# Patient Record
Sex: FEMALE | Race: WHITE
[De-identification: names, ages, dates, MRNs, and addresses within clinical notes are randomized per-mention and may not be internally consistent; named-entity substitution may affect disease eponyms.]

---

## 2018-03-05 ENCOUNTER — HOSPITAL ENCOUNTER (OUTPATIENT)
Dept: HOSPITAL 53 - M RAD | Age: 68
End: 2018-03-05
Attending: INTERNAL MEDICINE
Payer: MEDICARE

## 2018-03-05 ENCOUNTER — HOSPITAL ENCOUNTER (OUTPATIENT)
Dept: HOSPITAL 53 - M LAB REF | Age: 68
End: 2018-03-05
Attending: INTERNAL MEDICINE
Payer: MEDICARE

## 2018-03-05 DIAGNOSIS — Z13.9: ICD-10-CM

## 2018-03-05 DIAGNOSIS — R92.8: Primary | ICD-10-CM

## 2018-03-05 DIAGNOSIS — N60.01: ICD-10-CM

## 2018-03-05 DIAGNOSIS — Z13.9: Primary | ICD-10-CM

## 2018-03-05 DIAGNOSIS — N63.11: ICD-10-CM

## 2018-03-05 DIAGNOSIS — N60.02: ICD-10-CM

## 2018-03-05 DIAGNOSIS — R92.1: ICD-10-CM

## 2018-03-05 LAB
25(OH)D3 SERPL-MCNC: 27.7 NG/ML (ref 30–100)
ALBUMIN/GLOBULIN RATIO: 1.33 (ref 1–1.93)
ALBUMIN: 4 GM/DL (ref 3.2–5.2)
ALKALINE PHOSPHATASE: 107 U/L (ref 45–117)
ALT SERPL W P-5'-P-CCNC: 21 U/L (ref 12–78)
ANION GAP: 7 MEQ/L (ref 8–16)
AST SERPL-CCNC: 15 U/L (ref 7–37)
BASO #: 0.1 10^3/UL (ref 0–0.2)
BASO %: 1.4 % (ref 0–1)
BILIRUBIN,TOTAL: 0.5 MG/DL (ref 0.2–1)
BLOOD UREA NITROGEN: 13 MG/DL (ref 7–18)
CALCIUM LEVEL: 9.5 MG/DL (ref 8.8–10.2)
CARBON DIOXIDE LEVEL: 29 MEQ/L (ref 21–32)
CHLORIDE LEVEL: 107 MEQ/L (ref 98–107)
CHOLEST SERPL-MCNC: 213 MG/DL (ref ?–200)
CHOLESTEROL RISK RATIO: 2.96 (ref ?–5)
CREATININE FOR GFR: 0.69 MG/DL (ref 0.55–1.3)
EOS #: 0.2 10^3/UL (ref 0–0.5)
EOSINOPHIL NFR BLD AUTO: 3.2 % (ref 0–3)
EST. AVERAGE GLUCOSE BLD GHB EST-MCNC: 97 MG/DL (ref 60–110)
GFR SERPL CREATININE-BSD FRML MDRD: > 60 ML/MIN/{1.73_M2} (ref 45–?)
GLUCOSE, FASTING: 92 MG/DL (ref 70–100)
HCV AB SER QL: 0 INDEX (ref ?–0.8)
HDLC SERPL-MCNC: 72 MG/DL (ref 40–?)
HEMATOCRIT: 40.4 % (ref 36–47)
HEMOGLOBIN: 13.3 G/DL (ref 12–16)
HIV 1&2 SCREEN CENTAUR: NEGATIVE
IMMATURE GRANULOCYTE %: 0 % (ref 0–3)
LDL CHOLESTEROL: 105.4 MG/DL (ref ?–100)
LYMPH #: 1.4 10^3/UL (ref 1.5–4.5)
LYMPH %: 27.9 % (ref 24–44)
MEAN CORPUSCULAR HEMOGLOBIN: 30.5 PG (ref 27–33)
MEAN CORPUSCULAR HGB CONC: 32.9 G/DL (ref 32–36.5)
MEAN CORPUSCULAR VOLUME: 92.7 FL (ref 80–96)
MONO #: 0.6 10^3/UL (ref 0–0.8)
MONO %: 11.6 % (ref 0–5)
NEUTROPHILS #: 2.8 10^3/UL (ref 1.8–7.7)
NEUTROPHILS %: 55.9 % (ref 36–66)
NONHDLC SERPL-MCNC: 141 MG/DL
NRBC BLD AUTO-RTO: 0 % (ref 0–0)
PLATELET COUNT, AUTOMATED: 331 10^3/UL (ref 150–450)
POTASSIUM SERUM: 4.3 MEQ/L (ref 3.5–5.1)
RED BLOOD COUNT: 4.36 10^6/UL (ref 4–5.4)
RED CELL DISTRIBUTION WIDTH: 12 % (ref 11.5–14.5)
SODIUM LEVEL: 143 MEQ/L (ref 136–145)
THYROID STIMULATING HORMONE: 2.01 UIU/ML (ref 0.36–3.74)
TOTAL PROTEIN: 7 GM/DL (ref 6.4–8.2)
TRIGLYCERIDES LEVEL: 178 MG/DL (ref ?–150)
WHITE BLOOD COUNT: 5 10^3/UL (ref 4–10)

## 2018-03-05 PROCEDURE — 77066 DX MAMMO INCL CAD BI: CPT

## 2018-04-19 ENCOUNTER — HOSPITAL ENCOUNTER (OUTPATIENT)
Dept: HOSPITAL 53 - M WUC | Age: 68
End: 2018-04-19
Attending: INTERNAL MEDICINE
Payer: MEDICARE

## 2018-04-19 DIAGNOSIS — C50.919: Primary | ICD-10-CM

## 2018-04-19 LAB
ADD MANUAL DIFFER: YES
BASOPHILS: 2 % (ref 0–4)
DIFF SLIDE NUMBER: 174
EOSINOPHILS: 3 % (ref 0–5)
HEMATOCRIT: 35.4 % (ref 36–47)
HEMOGLOBIN: 12.4 G/DL (ref 12–15.5)
LYMPHOCYTES: 69 % (ref 16–52)
MEAN CORPUSCULAR HEMOGLOBIN: 31 PG (ref 27–33)
MEAN CORPUSCULAR HGB CONC: 35 G/DL (ref 32–36.5)
MEAN CORPUSCULAR VOLUME: 88.5 FL (ref 80–96)
MONOCYTES: 3 % (ref 0–8)
NEUTROPHILS: 23 % (ref 35–75)
NRBC BLD AUTO-RTO: 0 % (ref 0–0)
PLATELET BLD QL SMEAR: NORMAL
PLATELET COUNT, AUTOMATED: 200 10^3/UL (ref 150–450)
POSITIVE MORPH: (no result)
RBC MORPHOLOGY: NORMAL
RED BLOOD COUNT: 4 10^6/UL (ref 4–5.4)
RED CELL DISTRIBUTION WIDTH: 11.5 % (ref 11.5–14.5)
WHITE BLOOD COUNT: 3.8 10^3/UL (ref 4–10)

## 2018-04-19 PROCEDURE — 85025 COMPLETE CBC W/AUTO DIFF WBC: CPT

## 2018-04-27 ENCOUNTER — HOSPITAL ENCOUNTER (OUTPATIENT)
Dept: HOSPITAL 53 - M LAB REF | Age: 68
End: 2018-04-27
Attending: INTERNAL MEDICINE
Payer: MEDICARE

## 2018-04-27 DIAGNOSIS — I95.1: Primary | ICD-10-CM

## 2018-04-27 LAB
ANION GAP: 7 MEQ/L (ref 8–16)
BLOOD UREA NITROGEN: 29 MG/DL (ref 7–18)
CALCIUM LEVEL: 9.5 MG/DL (ref 8.8–10.2)
CARBON DIOXIDE LEVEL: 26 MEQ/L (ref 21–32)
CHLORIDE LEVEL: 108 MEQ/L (ref 98–107)
CREATININE FOR GFR: 0.62 MG/DL (ref 0.55–1.3)
GFR SERPL CREATININE-BSD FRML MDRD: > 60 ML/MIN/{1.73_M2} (ref 45–?)
GLUCOSE, FASTING: 109 MG/DL (ref 70–100)
MAGNESIUM LEVEL: 2.1 MG/DL (ref 1.8–2.4)
POTASSIUM SERUM: 3.4 MEQ/L (ref 3.5–5.1)
SODIUM LEVEL: 141 MEQ/L (ref 136–145)

## 2018-04-27 PROCEDURE — 83735 ASSAY OF MAGNESIUM: CPT

## 2018-05-03 ENCOUNTER — HOSPITAL ENCOUNTER (OUTPATIENT)
Dept: HOSPITAL 53 - M WUC | Age: 68
End: 2018-05-03
Attending: INTERNAL MEDICINE
Payer: MEDICARE

## 2018-05-03 DIAGNOSIS — C50.411: Primary | ICD-10-CM

## 2018-05-03 DIAGNOSIS — Z17.1: ICD-10-CM

## 2018-05-03 LAB
ADD MANUAL DIFFER: YES
ATYPICAL LYMPH: 4 % (ref 0–5)
BANDS: 3 % (ref ?–11)
BASOPHILS: 2 % (ref 0–4)
DIFF SLIDE NUMBER: 171
EOSINOPHILS: 4 % (ref 0–5)
HEMATOCRIT: 30.7 % (ref 36–47)
HEMOGLOBIN: 10.2 G/DL (ref 12–15.5)
LYMPHOCYTES: 28 % (ref 16–52)
MEAN CORPUSCULAR HEMOGLOBIN: 31.1 PG (ref 27–33)
MEAN CORPUSCULAR HGB CONC: 33.2 G/DL (ref 32–36.5)
MEAN CORPUSCULAR VOLUME: 93.6 FL (ref 80–96)
MONOCYTES: 5 % (ref 0–8)
NEUTROPHILS: 54 % (ref 35–75)
NRBC BLD AUTO-RTO: 0 % (ref 0–0)
PLATELET BLD QL SMEAR: NORMAL
PLATELET COUNT, AUTOMATED: 292 10^3/UL (ref 150–450)
POSITIVE MORPH: (no result)
RBC MORPHOLOGY: NORMAL
RED BLOOD COUNT: 3.28 10^6/UL (ref 4–5.4)
RED CELL DISTRIBUTION WIDTH: 12.8 % (ref 11.5–14.5)
WHITE BLOOD COUNT: 3.7 10^3/UL (ref 4–10)

## 2018-05-03 PROCEDURE — 85025 COMPLETE CBC W/AUTO DIFF WBC: CPT

## 2018-05-15 ENCOUNTER — HOSPITAL ENCOUNTER (OUTPATIENT)
Dept: HOSPITAL 53 - M LAB REF | Age: 68
End: 2018-05-15
Attending: INTERNAL MEDICINE
Payer: MEDICARE

## 2018-05-15 DIAGNOSIS — I95.1: Primary | ICD-10-CM

## 2018-05-15 LAB
ADD MANUAL DIFFER: YES
ALBUMIN/GLOBULIN RATIO: 1.35 (ref 1–1.93)
ALBUMIN: 3.5 GM/DL (ref 3.2–5.2)
ALKALINE PHOSPHATASE: 193 U/L (ref 45–117)
ALT SERPL W P-5'-P-CCNC: 20 U/L (ref 12–78)
ANION GAP: 7 MEQ/L (ref 8–16)
ANISOCYTOSIS: (no result)
APPEARANCE, URINE: (no result)
AST SERPL-CCNC: 15 U/L (ref 7–37)
BACTERIA UR QL AUTO: NEGATIVE
BILIRUBIN, URINE AUTO: NEGATIVE
BILIRUBIN,TOTAL: 0.3 MG/DL (ref 0.2–1)
BLOOD UREA NITROGEN: 19 MG/DL (ref 7–18)
BLOOD, URINE BLOOD: NEGATIVE
CALCIUM LEVEL: 9.3 MG/DL (ref 8.8–10.2)
CARBON DIOXIDE LEVEL: 28 MEQ/L (ref 21–32)
CHLORIDE LEVEL: 108 MEQ/L (ref 98–107)
CORTISOL AM: 17.3 UG/DL (ref 4.3–22.4)
CREATININE FOR GFR: 0.56 MG/DL (ref 0.55–1.3)
DIFF SLIDE NUMBER: 283
GFR SERPL CREATININE-BSD FRML MDRD: > 60 ML/MIN/{1.73_M2} (ref 45–?)
GLUCOSE, FASTING: 100 MG/DL (ref 70–100)
GLUCOSE, URINE (UA) AUTO: NEGATIVE MG/DL
HEMATOCRIT: 30.8 % (ref 36–47)
HEMOGLOBIN: 10.3 G/DL (ref 12–15.5)
HYPERSEGMENTED POLYS: (no result)
KETONE, URINE AUTO: NEGATIVE MG/DL
LEUKOCYTE ESTERASE UR QL STRIP.AUTO: NEGATIVE
LYMPHOCYTES: 3 % (ref 16–52)
MAGNESIUM LEVEL: 2.1 MG/DL (ref 1.8–2.4)
MEAN CORPUSCULAR HEMOGLOBIN: 31.6 PG (ref 27–33)
MEAN CORPUSCULAR HGB CONC: 33.4 G/DL (ref 32–36.5)
MEAN CORPUSCULAR VOLUME: 94.5 FL (ref 80–96)
MONOCYTES: 2 % (ref 0–8)
MUCUS, URINE: (no result)
NEUTROPHILS: 95 % (ref 35–75)
NITRITE, URINE AUTO: NEGATIVE
NRBC BLD AUTO-RTO: 0 % (ref 0–0)
PH,URINE: 5 UNITS (ref 5–9)
PLATELET BLD QL SMEAR: NORMAL
PLATELET COUNT, AUTOMATED: 187 10^3/UL (ref 150–450)
POIKILOCYTOSIS: (no result)
POS COUNT: (no result)
POSITIVE MORPH: (no result)
POTASSIUM SERUM: 4 MEQ/L (ref 3.5–5.1)
PROT UR QL STRIP.AUTO: NEGATIVE MG/DL
RBC, URINE AUTO: 2 /HPF (ref 0–3)
RED BLOOD COUNT: 3.26 10^6/UL (ref 4–5.4)
RED CELL DISTRIBUTION WIDTH: 14.1 % (ref 11.5–14.5)
SODIUM LEVEL: 143 MEQ/L (ref 136–145)
SPECIFIC GRAVITY URINE AUTO: 1.02 (ref 1–1.03)
SQUAMOUS #/AREA URNS AUTO: 0 /HPF (ref 0–6)
TOTAL PROTEIN: 6.1 GM/DL (ref 6.4–8.2)
URIC ACID CRYSTALS: (no result)
UROBILINOGEN, URINE AUTO: 0.2 MG/DL (ref 0–2)
WBC, URINE AUTO: 1 /HPF (ref 0–3)
WHITE BLOOD COUNT: 24.8 10^3/UL (ref 4–10)

## 2018-05-15 PROCEDURE — 83735 ASSAY OF MAGNESIUM: CPT

## 2018-07-18 ENCOUNTER — HOSPITAL ENCOUNTER (OUTPATIENT)
Dept: HOSPITAL 53 - M CARPUL | Age: 68
End: 2018-07-18
Attending: INTERNAL MEDICINE
Payer: MEDICARE

## 2018-07-18 DIAGNOSIS — I34.0: ICD-10-CM

## 2018-07-18 DIAGNOSIS — I27.20: ICD-10-CM

## 2018-07-18 DIAGNOSIS — R06.09: Primary | ICD-10-CM

## 2018-07-18 PROCEDURE — 93306 TTE W/DOPPLER COMPLETE: CPT

## 2018-07-20 ENCOUNTER — HOSPITAL ENCOUNTER (OUTPATIENT)
Dept: HOSPITAL 53 - M RAD | Age: 68
End: 2018-07-20
Attending: INTERNAL MEDICINE
Payer: MEDICARE

## 2018-07-20 DIAGNOSIS — R06.02: ICD-10-CM

## 2018-07-20 DIAGNOSIS — E04.1: Primary | ICD-10-CM

## 2018-07-20 DIAGNOSIS — C50.919: ICD-10-CM

## 2018-11-01 ENCOUNTER — HOSPITAL ENCOUNTER (OUTPATIENT)
Dept: HOSPITAL 53 - M ONCR | Age: 68
End: 2018-11-01
Attending: RADIOLOGY
Payer: MEDICARE

## 2018-11-01 DIAGNOSIS — C50.911: Primary | ICD-10-CM

## 2018-11-07 ENCOUNTER — HOSPITAL ENCOUNTER (OUTPATIENT)
Dept: HOSPITAL 53 - M ONCR | Age: 68
LOS: 23 days | End: 2018-11-30
Attending: RADIOLOGY
Payer: MEDICARE

## 2018-11-07 DIAGNOSIS — C50.411: Primary | ICD-10-CM

## 2018-11-07 PROCEDURE — 77300 RADIATION THERAPY DOSE PLAN: CPT

## 2018-12-05 NOTE — RADONC
RADIATION ONCOLOGY PROGRESS NOTE

 

DATE:  12/03/2018

 

CHART NUMBER:  

 

Ms. Wilson is presently at a dose of 1620 cGy to her right breast and is tolerating

treatments quite well at this point with no complaints related to her radiation

therapy.  She is having no breast or bone pain.

 

REVIEW OF SYSTEMS:

The patient's review of systems is noncontributory. She denies nausea, vomiting,

fevers, chills, night sweats, diplopia, headaches, anxiety or depression,

anorexia, weight loss, visual disturbances, chest pain, urinary or bowel

difficulties, bone pain, or neurological problems.

 

PHYSICAL EXAMINATION:

The patient's skin is in good condition with no evidence of radiation change

present.  There is no moist or dry desquamation.  The remainder of her physical

exam remains unchanged.

 

Ms. Wilson is tolerating treatments quite well and radiation will continue as

scheduled.

## 2018-12-11 NOTE — RADONC
RADIATION ONCOLOGY PROGRESS NOTE

 

DATE:  12/10/2018

 

CHART #:  

 

Ms. Wilson is presently at a dose of 2520 cGy to her right breast and is tolerating

treatments quite well at this point with no complaints related to her radiation

therapy.  She has got no breast or bone pain.

 

REVIEW OF SYSTEMS:

The patient's review of systems is noncontributory.  Denies nausea, vomiting,

fevers, chills, night sweats, diplopia, headaches, anxiety or depression,

anorexia, weight loss, visual disturbances, chest pain, urinary or bowel

difficulties, bone pain, or neurological problems.

 

PHYSICAL EXAMINATION:

The patient's skin is in good condition with no evidence of radiation change

present.  There is no moist or dry desquamation.  The remainder of her physical

exam remains unchanged.

 

Ms. Wilson is tolerating treatments quite well and radiation will continue as

scheduled.

## 2018-12-18 NOTE — RADONC
RADIATION ONCOLOGY PROGRESS NOTE

 

DATE:  12/17/2018

 

CHART NUMBER:  

 

PROGRESS NOTE:

Ms. Wilson is presently at a dose of 3240 cGy to her right breast and is tolerating

treatments quite well at this point with no complaints related to her radiation

therapy other than itching skin.

 

REVIEW OF SYSTEMS:

The patient's review of systems is positive for itching skin but is otherwise

noncontributory.  Denies nausea, vomiting, fevers, chills, night sweats,

diplopia, headaches, anxiety or depression, anorexia, weight loss, visual

disturbances, chest pain, urinary or bowel difficulties, bone pain, or

neurological problems.

 

PHYSICAL EXAMINATION:

The patient's skin is in good condition with no evidence of moist or dry

desquamation.  There is some erythema and tanning present.  The remainder of her

physical exam remains unchanged.

 

Ms. Wilson is tolerating treatments fairly well and radiation will continue as

scheduled.  She has been given skin care instructions.

## 2018-12-28 NOTE — RADONC
RADIATION ONCOLOGY PROGRESS NOTE

 

DATE:  12/26/2018

 

CHART NUMBER:  

 

PROGRESS NOTE:

Mrs. Wilson with a diagnosis of right breast cancer is currently receiving adjuvant

local regional radiotherapy.  Her current dose is 3780 cGy of an anticipated 5040

cGy to be followed by an 900 cGy boost of the lumpectomy scar site.  She is

tolerating her radiotherapy quite well with the exception of some erythema.  She

was complaining of quite a lot of itching in the area and has been given some

hydrocortisone cream.  Her skin at this point is causing her discomfort.  She

denies any nausea, vomiting, coughing, sputum production or hemoptysis.  The

remainder of the review of systems is noncontributory as she denies any

headaches, anxiety, depression, anorexia, weight loss, or visual disturbances.

 

EXAMINATION FINDINGS:

Skin within the irradiated volume shows erythema with no evidence of dry or moist

desquamation.  This extends into the axillary area.  The remainder of the

physical examination is unchanged.

 

IMPRESSION:

Tolerating therapy reasonably well.

 

PLAN:

We would like to give her a prescription for some Silvadene cream to use to help

with her skin discomfort.

## 2018-12-31 ENCOUNTER — HOSPITAL ENCOUNTER (OUTPATIENT)
Dept: HOSPITAL 53 - M ONCR | Age: 68
End: 2018-12-31
Attending: RADIOLOGY
Payer: MEDICARE

## 2018-12-31 DIAGNOSIS — C50.411: Primary | ICD-10-CM

## 2018-12-31 PROCEDURE — 96523 IRRIG DRUG DELIVERY DEVICE: CPT

## 2018-12-31 PROCEDURE — 77412 RADIATION TX DELIVERY LVL 3: CPT

## 2018-12-31 PROCEDURE — 77387 GUIDANCE FOR RADJ TX DLVR: CPT

## 2018-12-31 PROCEDURE — 77336 RADIATION PHYSICS CONSULT: CPT

## 2019-01-02 NOTE — RADONC
RADIATION ONCOLOGY PROGRESS NOTE

 

DATE:  12/31/2018

 

CHART NUMBER:  

 

PROGRESS NOTE:

Ms. Wilson is presently at a dose of 4320 cGy to her right breast and is tolerating

her treatments fairly well, although she did develop a brisk skin reaction.  She

is presently using Silvadene.

 

REVIEW OF SYSTEMS:

Other than the patient's skin reaction, her review of systems is noncontributory.

Denies nausea, vomiting, fevers, chills, night sweats, diplopia, headaches,

anxiety or depression, anorexia, weight loss, visual disturbances, chest pain,

urinary or bowel difficulties, bone pain, or neurological problems.

 

PHYSICAL EXAMINATION

The patient's skin overall is in generally good condition.  There is a small area

of desquamation in the axillary region.  The remainder of her physical exam

remains unchanged.

 

Ms. Wilson is tolerating treatments quite well and radiation will continue as

scheduled.

## 2019-01-03 NOTE — RADONC
RADIATION ONCOLOGY SIMULATION NOTE:

 

DATE:  01

 

CHART NUMBER: 

 

Ms. Wilson was taken to the linear accelerator today for clinical setup of her

right breast electron beam boost field.  Setup was accomplished without

difficulty or discomfort.  Radiation treatment planning is underway and radiation

treatments will begin subsequently.

 

An immobilization device was created will be used throughout the course of

treatment.  It was created without difficulty or discomfort.

 

I was physically present for the clinical simulation setup.

## 2019-01-15 NOTE — RADONC
RADIATION ONCOLOGY PROGRESS NOTE:

 

DATE: 01/14/2019

 

CHART NUMBER: 

 

Ms. Wilson is presently at a dose of 5760 cGy to her right breast primary site and

is tolerating treatments quite well at this point with no complaints at this time

related to her radiation therapy or disease.  She has had no breast or bone

pain.

 

REVIEW OF SYSTEMS:

The patient's review of systems is noncontributory.  She denies nausea, vomiting,

fevers, chills, night sweats, diplopia, headaches, anxiety or depression,

anorexia, weight loss, visual disturbances, chest pain, urinary or bowel

difficulties, bone pain, or neurological problems.

 

PHYSICAL EXAMINATION:

The patient's skin overall is in good condition with no evidence of moist or dry

desquamation.  The remainder of her physical exam remains unchanged.

 

Ms. Wilson Is tolerating treatments quite well and radiation will continue as

scheduled.

## 2019-01-17 NOTE — RADONC
RADIATION ONCOLOGY TREATMENT SUMMARY

 

DATE OF SERVICE:  01/16/2019

 

CHART #:  

 

DIAGNOSIS:  Right breast cancer.

 

STAGE:  II B, [id number].

 

ECOG PERFORMANCE STATUS:  0.

 

TREATMENT SUMMARY:

Ms. Wilson is a 68-year-old white female with the diagnosis of what appears to be a

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

who presented to us for consideration of postoperative radiation therapy for

conservative breast management.

 

We treated the patient to the right breast and supraclavicular as well as

axillary regions for a dose of 5040 cGy delivered in 28 fractions of 180 cGy each

over 50 elapsed days from 11/19/2018 through 01/08/2019.  The patient's right

breast and supraclavicular as well as axillary regions were treated on a linear

accelerator.  The breast region was treated using 3-D conformal technique with

medial and lateral tangential fields.  A 6 MV photon beam was utilized.  The

lymph node drainage sites were treated with a combination of 6 X and 15 X

photons.  3-D conformal therapy was utilized with an anterior oblique field and a

posterior axillary field.  Following completion of 5040 cGy to the entire right,

the breast primary site was boosted for an additional 900 cGy delivered in five

fractions of 180 cGy each from 01/09/2019 through 01/15/2019.  The primary site

boost was treated on the linear accelerator utilizing a 9 MEV electron beam

prescribed to the 90% isodose line via non phos technique.  This brought the

primary site to a total dose of 5940 cGy delivered in 33 fractions over 57

elapsed days from 11/19/2018 through 01/15/2019.

 

Ms. Wilson tolerated her treatments quite well and was able to complete therapy as

prescribed without interruption.

 

I have scheduled the patient to see me again in 1 month for further followup.

She will also continue to be followed by her other physicians as well.

 

Thank you for allowing us to participate in the in the care of this very pleasant

woman.  If I could be of any further assistance or provide you with any

information, please feel free to contact me at anytime.  As always warm regards.

 

 

 

 

 

cc:    MD Jose Juan Donald MD

## 2019-01-24 ENCOUNTER — HOSPITAL ENCOUNTER (OUTPATIENT)
Dept: HOSPITAL 53 - M ONCR | Age: 69
LOS: 7 days | End: 2019-01-31
Attending: RADIOLOGY
Payer: MEDICARE

## 2019-01-24 DIAGNOSIS — C50.411: Primary | ICD-10-CM

## 2019-03-20 ENCOUNTER — HOSPITAL ENCOUNTER (OUTPATIENT)
Dept: HOSPITAL 53 - M ONCR | Age: 69
End: 2019-03-20
Attending: RADIOLOGY
Payer: MEDICARE

## 2019-03-20 DIAGNOSIS — C50.411: Primary | ICD-10-CM

## 2019-03-21 NOTE — RADONC
RADIATION ONCOLOGY FOLLOWUP NOTE

 

DATE: 03/20/2019

 

CHART NUMBER: 

 

DIAGNOSIS:  Right breast cancer.

 

STAGE:  IIB, [id number].

 

ECOG PERFORMANCE STATUS: 0.

 

FOLLOWUP NOTE:

Ms. Wilsno is a very pleasant 68-year-old white female with the diagnosis of what

appears to be a stage IIB, [id number]  high-grade infiltrating ductal carcinoma of

the right breast who is presenting to us today for routine followup visit 2

months post completion of external beam radiation therapy.

 

The patient presents today reporting that she is doing quite well with no

complaints at this time related to her radiation therapy or disease.  She has no

breast or bone pain.

 

The patient's review of systems is noncontributory.  She denies nausea, vomiting,

fevers, chills, night sweats, diplopia, headaches, anxiety or depression,

anorexia, weight loss, visual disturbances, chest pain, urinary or bowel

difficulties, bone pain, or neurological problems.

 

PHYSICAL EXAMINATION:

The patient is a well-developed, well-nourished, female in no acute distress.

HEENT exam is normocephalic, atraumatic. Extraocular movements are intact.

There is no palpable cervical, supraclavicular, infraclavicular, axillary, or

inguinal lymphadenopathy present.

Lungs are clear to auscultation and percussion.

Heart has a regular rate and rhythm.

Abdomen is benign with no hepatosplenomegaly, masses, or tenderness.

Breast examination reveals no masses or discharge bilaterally.

Skeletal examination reveals no tenderness to pressure or percussion of the bony

skeleton.

Extremities reveal no clubbing, cyanosis, or edema.

Neurologic exam is grossly intact, as is the remainder of the physical

examination.

 

ASSESSMENT:

The patient is clinically KEON at this time.  She is being followed closely by her

medical oncologist as well as surgical oncologist.  In light of this I have

discharged her from my followup except on a as needed basis.

 

 

 

cc:    MD Jose Juan Donald MD

## 2019-06-04 ENCOUNTER — HOSPITAL ENCOUNTER (OUTPATIENT)
Dept: HOSPITAL 53 - M ONCR | Age: 69
End: 2019-06-04
Attending: RADIOLOGY
Payer: MEDICARE

## 2019-06-04 DIAGNOSIS — C50.411: Primary | ICD-10-CM

## 2020-01-25 ENCOUNTER — HOSPITAL ENCOUNTER (OUTPATIENT)
Dept: HOSPITAL 53 - M WUC | Age: 70
End: 2020-01-25
Attending: CLINICAL NURSE SPECIALIST
Payer: MEDICARE

## 2020-01-25 DIAGNOSIS — C50.411: Primary | ICD-10-CM

## 2020-01-25 DIAGNOSIS — Z17.1: ICD-10-CM

## 2020-01-25 LAB
BUN SERPL-MCNC: 16 MG/DL (ref 7–18)
CREAT SERPL-MCNC: 0.99 MG/DL (ref 0.55–1.3)
GFR SERPL CREATININE-BSD FRML MDRD: 59.6 ML/MIN/{1.73_M2} (ref 45–?)

## 2021-02-01 ENCOUNTER — HOSPITAL ENCOUNTER (OUTPATIENT)
Dept: HOSPITAL 53 - M WUC | Age: 71
End: 2021-02-01
Attending: CLINICAL NURSE SPECIALIST
Payer: MEDICARE

## 2021-02-01 DIAGNOSIS — Z17.1: ICD-10-CM

## 2021-02-01 DIAGNOSIS — C50.411: Primary | ICD-10-CM

## 2021-02-01 LAB
CREAT SERPL-MCNC: 1.01 MG/DL (ref 0.55–1.3)
GFR SERPL CREATININE-BSD FRML MDRD: 58 ML/MIN/{1.73_M2} (ref 45–?)

## 2021-04-06 ENCOUNTER — HOSPITAL ENCOUNTER (OUTPATIENT)
Dept: HOSPITAL 53 - M WUC | Age: 71
End: 2021-04-06
Attending: CLINICAL NURSE SPECIALIST
Payer: MEDICARE

## 2021-04-06 DIAGNOSIS — Z17.1: ICD-10-CM

## 2021-04-06 DIAGNOSIS — C50.411: Primary | ICD-10-CM

## 2021-04-06 LAB
CREAT SERPL-MCNC: 0.77 MG/DL (ref 0.55–1.3)
GFR SERPL CREATININE-BSD FRML MDRD: > 60 ML/MIN/{1.73_M2} (ref 45–?)

## 2022-03-12 ENCOUNTER — HOSPITAL ENCOUNTER (EMERGENCY)
Dept: HOSPITAL 53 - M ED | Age: 72
Discharge: HOME | End: 2022-03-12
Payer: MEDICARE

## 2022-03-12 VITALS — BODY MASS INDEX: 21.19 KG/M2 | HEIGHT: 61 IN | WEIGHT: 112.22 LBS

## 2022-03-12 VITALS — DIASTOLIC BLOOD PRESSURE: 87 MMHG | SYSTOLIC BLOOD PRESSURE: 178 MMHG

## 2022-03-12 DIAGNOSIS — M51.27: ICD-10-CM

## 2022-03-12 DIAGNOSIS — M54.32: Primary | ICD-10-CM

## 2022-03-12 DIAGNOSIS — M51.37: ICD-10-CM

## 2022-04-15 ENCOUNTER — HOSPITAL ENCOUNTER (OUTPATIENT)
Dept: HOSPITAL 53 - M SOG | Age: 72
End: 2022-04-15
Attending: ORTHOPAEDIC SURGERY
Payer: MEDICARE

## 2022-04-15 DIAGNOSIS — M19.041: ICD-10-CM

## 2022-04-15 DIAGNOSIS — M19.042: Primary | ICD-10-CM

## 2022-04-18 ENCOUNTER — HOSPITAL ENCOUNTER (OUTPATIENT)
Dept: HOSPITAL 53 - M LABSMTC | Age: 72
End: 2022-04-18
Attending: ANESTHESIOLOGY
Payer: MEDICARE

## 2022-04-18 DIAGNOSIS — Z20.822: ICD-10-CM

## 2022-04-18 DIAGNOSIS — Z01.812: Primary | ICD-10-CM

## 2022-04-20 ENCOUNTER — HOSPITAL ENCOUNTER (OUTPATIENT)
Dept: HOSPITAL 53 - M SDC | Age: 72
Discharge: HOME | End: 2022-04-20
Attending: ORTHOPAEDIC SURGERY
Payer: MEDICARE

## 2022-04-20 VITALS — WEIGHT: 111 LBS | HEIGHT: 61 IN | BODY MASS INDEX: 20.96 KG/M2

## 2022-04-20 VITALS — SYSTOLIC BLOOD PRESSURE: 131 MMHG | DIASTOLIC BLOOD PRESSURE: 69 MMHG

## 2022-04-20 DIAGNOSIS — Z92.3: ICD-10-CM

## 2022-04-20 DIAGNOSIS — M65.341: ICD-10-CM

## 2022-04-20 DIAGNOSIS — Z79.899: ICD-10-CM

## 2022-04-20 DIAGNOSIS — Z86.73: ICD-10-CM

## 2022-04-20 DIAGNOSIS — M65.331: Primary | ICD-10-CM

## 2022-04-20 DIAGNOSIS — K21.9: ICD-10-CM

## 2022-04-20 DIAGNOSIS — I10: ICD-10-CM

## 2022-04-20 DIAGNOSIS — E78.5: ICD-10-CM

## 2022-04-20 DIAGNOSIS — Z92.21: ICD-10-CM

## 2022-04-20 DIAGNOSIS — K59.00: ICD-10-CM

## 2022-04-20 DIAGNOSIS — Z85.3: ICD-10-CM

## 2022-04-25 ENCOUNTER — HOSPITAL ENCOUNTER (OUTPATIENT)
Dept: HOSPITAL 53 - M LAB REF | Age: 72
End: 2022-04-25
Attending: NURSE PRACTITIONER
Payer: MEDICARE

## 2022-04-25 ENCOUNTER — HOSPITAL ENCOUNTER (EMERGENCY)
Dept: HOSPITAL 53 - M ED | Age: 72
Discharge: LEFT BEFORE DISCHARGE | End: 2022-04-25
Payer: MEDICARE

## 2022-04-25 VITALS — SYSTOLIC BLOOD PRESSURE: 182 MMHG | DIASTOLIC BLOOD PRESSURE: 84 MMHG

## 2022-04-25 VITALS — WEIGHT: 110.23 LBS | BODY MASS INDEX: 20.81 KG/M2 | HEIGHT: 61 IN

## 2022-04-25 DIAGNOSIS — R32: Primary | ICD-10-CM

## 2022-04-25 DIAGNOSIS — Z53.21: Primary | ICD-10-CM

## 2022-04-27 ENCOUNTER — HOSPITAL ENCOUNTER (OUTPATIENT)
Dept: HOSPITAL 53 - M WHC | Age: 72
End: 2022-04-27
Attending: NURSE PRACTITIONER
Payer: MEDICARE

## 2022-04-27 DIAGNOSIS — M54.50: Primary | ICD-10-CM

## 2022-04-27 DIAGNOSIS — M79.605: ICD-10-CM

## 2022-04-30 ENCOUNTER — HOSPITAL ENCOUNTER (OUTPATIENT)
Dept: HOSPITAL 53 - M LABSMTC | Age: 72
End: 2022-04-30
Attending: ANESTHESIOLOGY
Payer: MEDICARE

## 2022-04-30 DIAGNOSIS — Z01.812: Primary | ICD-10-CM

## 2022-04-30 DIAGNOSIS — Z20.822: ICD-10-CM

## 2022-05-02 ENCOUNTER — HOSPITAL ENCOUNTER (OUTPATIENT)
Dept: HOSPITAL 53 - M PLAIMG | Age: 72
End: 2022-05-02
Attending: ORTHOPAEDIC SURGERY
Payer: MEDICARE

## 2022-05-02 DIAGNOSIS — M47.16: Primary | ICD-10-CM

## 2022-05-05 ENCOUNTER — HOSPITAL ENCOUNTER (OUTPATIENT)
Dept: HOSPITAL 53 - M OPP | Age: 72
Discharge: HOME | End: 2022-05-05
Attending: SURGERY
Payer: MEDICARE

## 2022-05-05 VITALS — WEIGHT: 105.8 LBS | BODY MASS INDEX: 19.98 KG/M2 | HEIGHT: 61 IN

## 2022-05-05 VITALS — DIASTOLIC BLOOD PRESSURE: 72 MMHG | SYSTOLIC BLOOD PRESSURE: 158 MMHG

## 2022-05-05 DIAGNOSIS — Z92.21: ICD-10-CM

## 2022-05-05 DIAGNOSIS — K57.30: ICD-10-CM

## 2022-05-05 DIAGNOSIS — Z86.73: ICD-10-CM

## 2022-05-05 DIAGNOSIS — K44.9: ICD-10-CM

## 2022-05-05 DIAGNOSIS — Z80.3: ICD-10-CM

## 2022-05-05 DIAGNOSIS — D12.0: Primary | ICD-10-CM

## 2022-05-05 DIAGNOSIS — D12.2: ICD-10-CM

## 2022-05-05 DIAGNOSIS — Z92.3: ICD-10-CM

## 2022-05-05 DIAGNOSIS — Z85.3: ICD-10-CM

## 2022-05-05 DIAGNOSIS — Z80.0: ICD-10-CM

## 2022-05-05 DIAGNOSIS — I10: ICD-10-CM

## 2022-05-05 DIAGNOSIS — Z79.899: ICD-10-CM

## 2022-05-26 ENCOUNTER — HOSPITAL ENCOUNTER (OUTPATIENT)
Dept: HOSPITAL 53 - M LAB REF | Age: 72
End: 2022-05-26
Attending: ORTHOPAEDIC SURGERY
Payer: MEDICARE

## 2022-05-26 DIAGNOSIS — M47.27: Primary | ICD-10-CM

## 2022-05-26 LAB
BUN SERPL-MCNC: 20 MG/DL (ref 7–18)
CREAT SERPL-MCNC: 0.96 MG/DL (ref 0.55–1.3)
GFR SERPL CREATININE-BSD FRML MDRD: > 60 ML/MIN/{1.73_M2} (ref 45–?)

## 2022-06-01 ENCOUNTER — HOSPITAL ENCOUNTER (OUTPATIENT)
Dept: HOSPITAL 53 - M PLAIMG | Age: 72
End: 2022-06-01
Attending: ORTHOPAEDIC SURGERY
Payer: MEDICARE

## 2022-06-01 DIAGNOSIS — M51.34: ICD-10-CM

## 2022-06-01 DIAGNOSIS — M47.27: Primary | ICD-10-CM

## 2022-06-01 PROCEDURE — 72158 MRI LUMBAR SPINE W/O & W/DYE: CPT

## 2022-06-01 PROCEDURE — 72157 MRI CHEST SPINE W/O & W/DYE: CPT

## 2022-08-08 ENCOUNTER — HOSPITAL ENCOUNTER (OUTPATIENT)
Dept: HOSPITAL 53 - M PLAIMG | Age: 72
End: 2022-08-08
Attending: NURSE PRACTITIONER
Payer: MEDICARE

## 2022-08-08 DIAGNOSIS — R93.7: ICD-10-CM

## 2022-08-08 DIAGNOSIS — E04.1: Primary | ICD-10-CM

## 2022-08-08 DIAGNOSIS — R91.8: ICD-10-CM

## 2022-08-29 ENCOUNTER — HOSPITAL ENCOUNTER (OUTPATIENT)
Dept: HOSPITAL 53 - M PLARAD | Age: 72
End: 2022-08-29
Attending: NURSE PRACTITIONER
Payer: MEDICARE

## 2022-08-29 DIAGNOSIS — R91.8: Primary | ICD-10-CM

## 2022-08-29 PROCEDURE — 78815 PET IMAGE W/CT SKULL-THIGH: CPT

## 2023-01-20 ENCOUNTER — HOSPITAL ENCOUNTER (OUTPATIENT)
Dept: HOSPITAL 53 - M WHC | Age: 73
End: 2023-01-20
Attending: NURSE PRACTITIONER
Payer: MEDICARE

## 2023-01-20 DIAGNOSIS — Z13.820: ICD-10-CM

## 2023-01-20 DIAGNOSIS — Z85.3: ICD-10-CM

## 2023-01-20 DIAGNOSIS — M81.0: ICD-10-CM

## 2023-01-20 DIAGNOSIS — Z12.31: Primary | ICD-10-CM

## 2023-01-20 DIAGNOSIS — M85.88: ICD-10-CM

## 2023-02-13 ENCOUNTER — HOSPITAL ENCOUNTER (OUTPATIENT)
Dept: HOSPITAL 53 - M LAB REF | Age: 73
End: 2023-02-13
Attending: NURSE PRACTITIONER
Payer: MEDICARE

## 2023-02-13 DIAGNOSIS — M81.0: Primary | ICD-10-CM

## 2023-02-13 LAB
25(OH)D3 SERPL-MCNC: 21.5 NG/ML (ref 20–100)
BUN SERPL-MCNC: 17 MG/DL (ref 9–23)
CALCIUM SERPL-MCNC: 9.6 MG/DL (ref 8.3–10.6)
CHLORIDE SERPL-SCNC: 107 MMOL/L (ref 98–107)
CO2 SERPL-SCNC: 30 MMOL/L (ref 20–31)
CREAT SERPL-MCNC: 0.86 MG/DL (ref 0.55–1.3)
GFR SERPL CREATININE-BSD FRML MDRD: > 60 ML/MIN/{1.73_M2} (ref 39–?)
GLUCOSE SERPL-MCNC: 84 MG/DL (ref 74–106)
POTASSIUM SERPL-SCNC: 3.9 MMOL/L (ref 3.5–5.1)
SODIUM SERPL-SCNC: 141 MMOL/L (ref 136–145)

## 2023-03-16 ENCOUNTER — HOSPITAL ENCOUNTER (OUTPATIENT)
Dept: HOSPITAL 53 - M PLAIMG | Age: 73
End: 2023-03-16
Attending: NURSE PRACTITIONER
Payer: MEDICARE

## 2023-03-16 DIAGNOSIS — R91.8: Primary | ICD-10-CM

## 2023-04-26 ENCOUNTER — HOSPITAL ENCOUNTER (OUTPATIENT)
Dept: HOSPITAL 53 - M RAD | Age: 73
End: 2023-04-26
Attending: NURSE PRACTITIONER
Payer: MEDICARE

## 2023-04-26 DIAGNOSIS — E04.1: Primary | ICD-10-CM

## 2023-08-29 ENCOUNTER — HOSPITAL ENCOUNTER (EMERGENCY)
Dept: HOSPITAL 53 - M ED | Age: 73
Discharge: HOME | End: 2023-08-29
Payer: MEDICARE

## 2023-08-29 VITALS — TEMPERATURE: 97.7 F

## 2023-08-29 VITALS — HEIGHT: 60 IN | BODY MASS INDEX: 25.15 KG/M2 | WEIGHT: 128.09 LBS

## 2023-08-29 VITALS — SYSTOLIC BLOOD PRESSURE: 158 MMHG | DIASTOLIC BLOOD PRESSURE: 83 MMHG | OXYGEN SATURATION: 97 %

## 2023-08-29 DIAGNOSIS — G51.0: Primary | ICD-10-CM

## 2023-08-29 DIAGNOSIS — I10: ICD-10-CM

## 2023-08-29 DIAGNOSIS — Z79.02: ICD-10-CM

## 2023-08-29 DIAGNOSIS — Z79.899: ICD-10-CM

## 2023-08-29 DIAGNOSIS — Z85.3: ICD-10-CM

## 2023-08-29 DIAGNOSIS — Z88.8: ICD-10-CM

## 2023-08-29 DIAGNOSIS — Z86.79: ICD-10-CM

## 2023-08-29 DIAGNOSIS — K21.9: ICD-10-CM

## 2023-08-29 DIAGNOSIS — F41.9: ICD-10-CM

## 2023-08-29 DIAGNOSIS — Z79.811: ICD-10-CM

## 2023-08-29 DIAGNOSIS — F32.A: ICD-10-CM

## 2023-12-15 ENCOUNTER — HOSPITAL ENCOUNTER (OUTPATIENT)
Dept: HOSPITAL 53 - M LAB REF | Age: 73
End: 2023-12-15
Payer: MEDICARE

## 2023-12-15 DIAGNOSIS — T46.6X5A: ICD-10-CM

## 2023-12-15 DIAGNOSIS — E04.1: Primary | ICD-10-CM

## 2023-12-15 DIAGNOSIS — Z79.899: ICD-10-CM

## 2023-12-15 DIAGNOSIS — E78.5: ICD-10-CM

## 2023-12-15 DIAGNOSIS — E66.3: ICD-10-CM

## 2023-12-15 LAB
BASOPHILS # BLD AUTO: 0.1 10^3/UL (ref 0–0.2)
BASOPHILS NFR BLD AUTO: 0.8 % (ref 0–1)
BUN SERPL-MCNC: 21 MG/DL (ref 9–23)
CALCIUM SERPL-MCNC: 10.3 MG/DL (ref 8.3–10.6)
CHLORIDE SERPL-SCNC: 106 MMOL/L (ref 98–107)
CHOLEST SERPL-MCNC: 179 MG/DL (ref ?–200)
CHOLEST/HDLC SERPL: 2.47 {RATIO} (ref ?–5)
CK SERPL-CCNC: 147 U/L (ref 34–145)
CO2 SERPL-SCNC: 27 MMOL/L (ref 20–31)
CREAT SERPL-MCNC: 0.68 MG/DL (ref 0.55–1.3)
EOSINOPHIL # BLD AUTO: 0.2 10^3/UL (ref 0–0.5)
EOSINOPHIL NFR BLD AUTO: 2.5 % (ref 0–3)
EST. AVERAGE GLUCOSE BLD GHB EST-MCNC: 100 MG/DL (ref 60–110)
GFR SERPL CREATININE-BSD FRML MDRD: > 60 ML/MIN/{1.73_M2} (ref 39–?)
GLUCOSE SERPL-MCNC: 96 MG/DL (ref 74–106)
HCT VFR BLD AUTO: 41 % (ref 36–47)
HDLC SERPL-MCNC: 72.4 MG/DL (ref 40–?)
HGB BLD-MCNC: 13.3 G/DL (ref 12–15.5)
LDLC SERPL CALC-MCNC: 80.4 MG/DL (ref ?–100)
LYMPHOCYTES # BLD AUTO: 2 10^3/UL (ref 1.5–5)
LYMPHOCYTES NFR BLD AUTO: 24.1 % (ref 24–44)
MCH RBC QN AUTO: 31 PG (ref 27–33)
MCHC RBC AUTO-ENTMCNC: 32.4 G/DL (ref 32–36.5)
MCV RBC AUTO: 95.6 FL (ref 80–96)
MONOCYTES # BLD AUTO: 0.8 10^3/UL (ref 0–0.8)
MONOCYTES NFR BLD AUTO: 9.9 % (ref 2–8)
NEUTROPHILS # BLD AUTO: 5.2 10^3/UL (ref 1.5–8.5)
NEUTROPHILS NFR BLD AUTO: 62.5 % (ref 36–66)
NONHDLC SERPL-MCNC: 106.6 MG/DL
PLATELET # BLD AUTO: 323 10^3/UL (ref 150–450)
POTASSIUM SERPL-SCNC: 3.9 MMOL/L (ref 3.5–5.1)
RBC # BLD AUTO: 4.29 10^6/UL (ref 4–5.4)
SODIUM SERPL-SCNC: 140 MMOL/L (ref 136–145)
TRIGL SERPL-MCNC: 131 MG/DL (ref ?–150)
TSH SERPL DL<=0.005 MIU/L-ACNC: 2.66 UIU/ML (ref 0.55–4.78)
WBC # BLD AUTO: 8.4 10^3/UL (ref 4–10)

## 2024-01-22 ENCOUNTER — HOSPITAL ENCOUNTER (OUTPATIENT)
Dept: HOSPITAL 53 - M WHC | Age: 74
End: 2024-01-22
Payer: MEDICARE

## 2024-01-22 DIAGNOSIS — Z12.31: Primary | ICD-10-CM

## 2024-01-26 ENCOUNTER — HOSPITAL ENCOUNTER (OUTPATIENT)
Dept: HOSPITAL 53 - M LAB REF | Age: 74
End: 2024-01-26
Payer: MEDICARE

## 2024-01-26 DIAGNOSIS — T46.6X5A: ICD-10-CM

## 2024-01-26 DIAGNOSIS — E66.3: Primary | ICD-10-CM

## 2024-01-26 LAB
BASOPHILS # BLD AUTO: 0.1 10^3/UL (ref 0–0.2)
BASOPHILS NFR BLD AUTO: 1.1 % (ref 0–1)
EOSINOPHIL # BLD AUTO: 0.1 10^3/UL (ref 0–0.5)
EOSINOPHIL NFR BLD AUTO: 1.9 % (ref 0–3)
HCT VFR BLD AUTO: 42.2 % (ref 36–47)
HGB BLD-MCNC: 13.8 G/DL (ref 12–15.5)
LYMPHOCYTES # BLD AUTO: 1.6 10^3/UL (ref 1.5–5)
LYMPHOCYTES NFR BLD AUTO: 25.8 % (ref 24–44)
MCH RBC QN AUTO: 31 PG (ref 27–33)
MCHC RBC AUTO-ENTMCNC: 32.7 G/DL (ref 32–36.5)
MCV RBC AUTO: 94.8 FL (ref 80–96)
MONOCYTES # BLD AUTO: 0.8 10^3/UL (ref 0–0.8)
MONOCYTES NFR BLD AUTO: 12.3 % (ref 2–8)
NEUTROPHILS # BLD AUTO: 3.7 10^3/UL (ref 1.5–8.5)
NEUTROPHILS NFR BLD AUTO: 58.7 % (ref 36–66)
PLATELET # BLD AUTO: 322 10^3/UL (ref 150–450)
RBC # BLD AUTO: 4.45 10^6/UL (ref 4–5.4)
WBC # BLD AUTO: 6.2 10^3/UL (ref 4–10)

## 2024-02-21 ENCOUNTER — HOSPITAL ENCOUNTER (OUTPATIENT)
Dept: HOSPITAL 53 - M OPP | Age: 74
Discharge: HOME | End: 2024-02-21
Attending: SURGERY
Payer: MEDICARE

## 2024-02-21 VITALS — WEIGHT: 136.6 LBS | BODY MASS INDEX: 25.46 KG/M2 | HEIGHT: 61.5 IN

## 2024-02-21 VITALS — DIASTOLIC BLOOD PRESSURE: 80 MMHG | OXYGEN SATURATION: 99 % | SYSTOLIC BLOOD PRESSURE: 175 MMHG

## 2024-02-21 VITALS — TEMPERATURE: 97.5 F

## 2024-02-21 DIAGNOSIS — Z79.83: ICD-10-CM

## 2024-02-21 DIAGNOSIS — K44.9: ICD-10-CM

## 2024-02-21 DIAGNOSIS — Z79.02: ICD-10-CM

## 2024-02-21 DIAGNOSIS — Z86.73: ICD-10-CM

## 2024-02-21 DIAGNOSIS — Z79.1: ICD-10-CM

## 2024-02-21 DIAGNOSIS — K20.80: ICD-10-CM

## 2024-02-21 DIAGNOSIS — K21.00: Primary | ICD-10-CM

## 2024-02-21 DIAGNOSIS — Z79.899: ICD-10-CM

## 2024-02-21 RX ADMIN — SODIUM CHLORIDE ONE MLS/HR: 9 INJECTION, SOLUTION INTRAVENOUS at 07:38

## 2024-02-27 ENCOUNTER — HOSPITAL ENCOUNTER (OUTPATIENT)
Dept: HOSPITAL 53 - M SDC | Age: 74
Discharge: HOME | End: 2024-02-27
Attending: ORTHOPAEDIC SURGERY
Payer: MEDICARE

## 2024-02-27 VITALS — HEIGHT: 59 IN | WEIGHT: 134.8 LBS | BODY MASS INDEX: 27.17 KG/M2

## 2024-02-27 VITALS — DIASTOLIC BLOOD PRESSURE: 69 MMHG | TEMPERATURE: 97.2 F | OXYGEN SATURATION: 100 % | SYSTOLIC BLOOD PRESSURE: 142 MMHG

## 2024-02-27 DIAGNOSIS — I73.00: ICD-10-CM

## 2024-02-27 DIAGNOSIS — M65.342: ICD-10-CM

## 2024-02-27 DIAGNOSIS — K21.9: ICD-10-CM

## 2024-02-27 DIAGNOSIS — Z85.3: ICD-10-CM

## 2024-02-27 DIAGNOSIS — Z79.899: ICD-10-CM

## 2024-02-27 DIAGNOSIS — Z88.8: ICD-10-CM

## 2024-02-27 DIAGNOSIS — E78.00: ICD-10-CM

## 2024-02-27 DIAGNOSIS — G56.01: Primary | ICD-10-CM

## 2024-02-27 DIAGNOSIS — M65.332: ICD-10-CM

## 2024-02-27 DIAGNOSIS — I10: ICD-10-CM

## 2024-02-27 RX ADMIN — WATER ONE MEQ: 100 INJECTION, SOLUTION INTRAVENOUS at 09:00

## 2024-02-27 RX ADMIN — BACITRACIN ONE DOSE: 500 OINTMENT TOPICAL at 11:18

## 2024-02-27 RX ADMIN — LIDOCAINE HYDROCHLORIDE,EPINEPHRINE BITARTRATE ONE ML: 10; .01 INJECTION, SOLUTION INFILTRATION; PERINEURAL at 09:00

## 2024-03-26 ENCOUNTER — HOSPITAL ENCOUNTER (OUTPATIENT)
Dept: HOSPITAL 53 - M RAD | Age: 74
End: 2024-03-26
Attending: SURGERY
Payer: MEDICARE

## 2024-03-26 DIAGNOSIS — K21.9: Primary | ICD-10-CM

## 2024-05-10 ENCOUNTER — HOSPITAL ENCOUNTER (OUTPATIENT)
Dept: HOSPITAL 53 - M LAB REF | Age: 74
End: 2024-05-10
Payer: MEDICARE

## 2024-05-10 DIAGNOSIS — E78.5: Primary | ICD-10-CM

## 2024-05-10 LAB
CHOLEST SERPL-MCNC: 281 MG/DL (ref ?–200)
CHOLEST/HDLC SERPL: 4.77 {RATIO} (ref ?–5)
HDLC SERPL-MCNC: 58.8 MG/DL (ref 40–?)
LDLC SERPL CALC-MCNC: 174 MG/DL (ref ?–100)
NONHDLC SERPL-MCNC: 222.2 MG/DL
TRIGL SERPL-MCNC: 241 MG/DL (ref ?–150)

## 2024-05-17 ENCOUNTER — HOSPITAL ENCOUNTER (OUTPATIENT)
Dept: HOSPITAL 53 - M LAB REF | Age: 74
End: 2024-05-17
Payer: MEDICARE

## 2024-05-17 DIAGNOSIS — R69: Primary | ICD-10-CM

## 2024-05-17 DIAGNOSIS — M79.10: ICD-10-CM

## 2024-05-17 DIAGNOSIS — Z86.39: ICD-10-CM

## 2024-05-17 LAB
CRP SERPL-MCNC: < 0.4 MG/DL (ref ?–1)
FOLATE SERPL-MCNC: > 24 NG/ML (ref 5.4–?)
IRON SATN MFR SERPL: 33.3 % (ref 13.2–45)
IRON SERPL-MCNC: 103 UG/DL (ref 50–170)
MAGNESIUM SERPL-MCNC: 2 MG/DL (ref 1.8–2.4)
RHEUMATOID FACT SERPL-ACNC: 7.9 IU/ML (ref ?–14)
TIBC SERPL-MCNC: 309 UG/DL (ref 250–425)
VIT B12 SERPL-MCNC: 462 PG/ML (ref 211–911)

## 2024-05-20 LAB
ANA INTERCELL BRIDGE TITR SER IF: (no result) {TITER}
ANA NUCLEAR DOTS TITR SER IF: (no result) {TITER}
ANA NUCLEAR MEMBRANE PORES TITR SER IF: (no result) {TITER}
DEPRECATED CENTRIOLE AB TITR SER IF: (no result) {TITER}
ENA PCNA AB TITR SER IF: (no result) {TITER}
MITOTIC SPINDLE APP AB TITR SERPL IF: (no result) {TITER}

## 2024-06-04 ENCOUNTER — HOSPITAL ENCOUNTER (OUTPATIENT)
Dept: HOSPITAL 53 - M OPP | Age: 74
Discharge: HOME | End: 2024-06-04
Attending: SURGERY
Payer: MEDICARE

## 2024-06-04 VITALS — DIASTOLIC BLOOD PRESSURE: 65 MMHG | TEMPERATURE: 97.9 F | OXYGEN SATURATION: 100 % | SYSTOLIC BLOOD PRESSURE: 141 MMHG

## 2024-06-04 VITALS — HEIGHT: 60 IN | BODY MASS INDEX: 25.52 KG/M2 | WEIGHT: 130 LBS

## 2024-06-04 DIAGNOSIS — K44.9: Primary | ICD-10-CM

## 2024-06-24 ENCOUNTER — HOSPITAL ENCOUNTER (OUTPATIENT)
Dept: HOSPITAL 53 - M PLAIMG | Age: 74
End: 2024-06-24
Payer: MEDICARE

## 2024-06-24 DIAGNOSIS — R01.1: Primary | ICD-10-CM

## 2024-07-16 ENCOUNTER — HOSPITAL ENCOUNTER (OUTPATIENT)
Dept: HOSPITAL 53 - M LAB REF | Age: 74
End: 2024-07-16
Payer: MEDICARE

## 2024-07-16 DIAGNOSIS — M79.10: Primary | ICD-10-CM

## 2024-07-16 DIAGNOSIS — E78.2: ICD-10-CM

## 2024-07-16 LAB
ALBUMIN SERPL BCG-MCNC: 3.8 G/DL (ref 3.2–5.2)
ALP SERPL-CCNC: 84 U/L (ref 46–116)
ALT SERPL W P-5'-P-CCNC: 17 U/L (ref 7–40)
AST SERPL-CCNC: 10 U/L (ref ?–34)
BILIRUB CONJ SERPL-MCNC: 0.1 MG/DL (ref ?–0.4)
BILIRUB SERPL-MCNC: 0.5 MG/DL (ref 0.3–1.2)
CHOLEST SERPL-MCNC: 163 MG/DL (ref ?–200)
CHOLEST/HDLC SERPL: 2.68 {RATIO} (ref ?–5)
HDLC SERPL-MCNC: 60.8 MG/DL (ref 40–?)
LDLC SERPL CALC-MCNC: 64.8 MG/DL (ref ?–100)
NONHDLC SERPL-MCNC: 102.2 MG/DL
PROT SERPL-MCNC: 6.6 G/DL (ref 5.7–8.2)
TRIGL SERPL-MCNC: 187 MG/DL (ref ?–150)

## 2024-08-08 ENCOUNTER — HOSPITAL ENCOUNTER (OUTPATIENT)
Dept: HOSPITAL 53 - M LAB REF | Age: 74
End: 2024-08-08
Payer: MEDICARE

## 2024-08-08 DIAGNOSIS — R06.09: Primary | ICD-10-CM

## 2024-09-05 ENCOUNTER — HOSPITAL ENCOUNTER (OUTPATIENT)
Dept: HOSPITAL 53 - M WUC | Age: 74
End: 2024-09-05
Payer: MEDICARE

## 2024-09-05 DIAGNOSIS — I10: Primary | ICD-10-CM

## 2024-09-05 DIAGNOSIS — R06.09: ICD-10-CM

## 2024-09-05 LAB
BUN SERPL-MCNC: 22 MG/DL (ref 9–23)
CALCIUM SERPL-MCNC: 11.1 MG/DL (ref 8.3–10.6)
CHLORIDE SERPL-SCNC: 103 MMOL/L (ref 98–107)
CO2 SERPL-SCNC: 30 MMOL/L (ref 20–31)
CREAT SERPL-MCNC: 1 MG/DL (ref 0.55–1.3)
GFR SERPL CREATININE-BSD FRML MDRD: 58 ML/MIN/{1.73_M2} (ref 39–?)
GLUCOSE SERPL-MCNC: 99 MG/DL (ref 74–106)
POTASSIUM SERPL-SCNC: 3.3 MMOL/L (ref 3.5–5.1)
SODIUM SERPL-SCNC: 140 MMOL/L (ref 136–145)

## 2024-10-16 ENCOUNTER — HOSPITAL ENCOUNTER (OUTPATIENT)
Dept: HOSPITAL 53 - M CARPUL | Age: 74
End: 2024-10-16
Payer: MEDICARE

## 2024-10-16 DIAGNOSIS — R05.9: Primary | ICD-10-CM

## 2025-02-03 ENCOUNTER — HOSPITAL ENCOUNTER (OUTPATIENT)
Dept: HOSPITAL 53 - M WUC | Age: 75
End: 2025-02-03
Attending: PHYSICIAN ASSISTANT
Payer: MEDICARE

## 2025-02-03 ENCOUNTER — HOSPITAL ENCOUNTER (OUTPATIENT)
Dept: HOSPITAL 53 - M WUC | Age: 75
End: 2025-02-03
Payer: MEDICARE

## 2025-02-03 DIAGNOSIS — I10: Primary | ICD-10-CM

## 2025-02-03 DIAGNOSIS — I10: ICD-10-CM

## 2025-02-03 DIAGNOSIS — R06.02: Primary | ICD-10-CM

## 2025-02-03 LAB
BUN SERPL-MCNC: 25 MG/DL (ref 9–23)
CALCIUM SERPL-MCNC: 10.3 MG/DL (ref 8.3–10.6)
CHLORIDE SERPL-SCNC: 105 MMOL/L (ref 98–107)
CO2 SERPL-SCNC: 32 MMOL/L (ref 20–31)
CREAT SERPL-MCNC: 0.99 MG/DL (ref 0.55–1.3)
GFR SERPL CREATININE-BSD FRML MDRD: 58.7 ML/MIN/{1.73_M2} (ref 39–?)
GLUCOSE SERPL-MCNC: 98 MG/DL (ref 74–106)
HCT VFR BLD AUTO: 38 % (ref 36–47)
HGB BLD-MCNC: 12.7 G/DL (ref 12–15.5)
MCH RBC QN AUTO: 31.1 PG (ref 27–33)
MCHC RBC AUTO-ENTMCNC: 33.4 G/DL (ref 32–36.5)
MCV RBC AUTO: 93.1 FL (ref 80–96)
PLATELET # BLD AUTO: 344 10^3/UL (ref 150–450)
POTASSIUM SERPL-SCNC: 3.3 MMOL/L (ref 3.5–5.1)
RBC # BLD AUTO: 4.08 10^6/UL (ref 4–5.4)
SODIUM SERPL-SCNC: 144 MMOL/L (ref 136–145)
WBC # BLD AUTO: 7.5 10^3/UL (ref 4–10)

## 2025-02-13 ENCOUNTER — HOSPITAL ENCOUNTER (OUTPATIENT)
Dept: HOSPITAL 53 - M CARPUL | Age: 75
End: 2025-02-13
Attending: PHYSICIAN ASSISTANT
Payer: MEDICARE

## 2025-02-13 DIAGNOSIS — R06.02: Primary | ICD-10-CM

## 2025-02-18 ENCOUNTER — HOSPITAL ENCOUNTER (OUTPATIENT)
Dept: HOSPITAL 53 - M WUC | Age: 75
End: 2025-02-18
Payer: MEDICARE

## 2025-02-18 DIAGNOSIS — M54.2: Primary | ICD-10-CM

## 2025-02-18 DIAGNOSIS — M48.02: ICD-10-CM

## 2025-02-18 DIAGNOSIS — M47.812: ICD-10-CM

## 2025-04-10 ENCOUNTER — HOSPITAL ENCOUNTER (OUTPATIENT)
Dept: HOSPITAL 53 - M LAB REF | Age: 75
End: 2025-04-10
Payer: MEDICARE

## 2025-04-10 DIAGNOSIS — R79.0: Primary | ICD-10-CM

## 2025-04-10 LAB
CALCIUM SERPL-MCNC: 10.2 MG/DL (ref 8.3–10.6)
CREAT SERPL-MCNC: 1.52 MG/DL (ref 0.55–1.3)
GFR SERPL CREATININE-BSD FRML MDRD: 36.2 ML/MIN/{1.73_M2} (ref 39–?)
POTASSIUM SERPL-SCNC: 2.7 MMOL/L (ref 3.5–5.1)

## 2025-04-14 ENCOUNTER — HOSPITAL ENCOUNTER (OUTPATIENT)
Dept: HOSPITAL 53 - M LAB REF | Age: 75
End: 2025-04-14
Payer: MEDICARE

## 2025-04-14 DIAGNOSIS — E87.6: Primary | ICD-10-CM

## 2025-04-14 LAB
CALCIUM SERPL-MCNC: 10.7 MG/DL (ref 8.3–10.6)
CREAT SERPL-MCNC: 0.92 MG/DL (ref 0.55–1.3)
GFR SERPL CREATININE-BSD FRML MDRD: 66.2 ML/MIN/{1.73_M2} (ref 39–?)
POTASSIUM SERPL-SCNC: 3.9 MMOL/L (ref 3.5–5.1)

## 2025-05-20 ENCOUNTER — HOSPITAL ENCOUNTER (OUTPATIENT)
Dept: HOSPITAL 53 - M WUC | Age: 75
End: 2025-05-20
Payer: MEDICARE

## 2025-05-20 DIAGNOSIS — E87.6: Primary | ICD-10-CM

## 2025-05-20 LAB
CALCIUM SERPL-MCNC: 10.5 MG/DL (ref 8.3–10.6)
CREAT SERPL-MCNC: 1.62 MG/DL (ref 0.55–1.3)
GFR SERPL CREATININE-BSD FRML MDRD: 33.6 ML/MIN/{1.73_M2} (ref 39–?)
POTASSIUM SERPL-SCNC: 3.3 MMOL/L (ref 3.5–5.1)